# Patient Record
Sex: FEMALE | Race: WHITE | URBAN - METROPOLITAN AREA
[De-identification: names, ages, dates, MRNs, and addresses within clinical notes are randomized per-mention and may not be internally consistent; named-entity substitution may affect disease eponyms.]

---

## 2020-05-01 ENCOUNTER — OFFICE VISIT (OUTPATIENT)
Dept: URGENT CARE | Facility: URGENT CARE | Age: 55
End: 2020-05-01
Payer: COMMERCIAL

## 2020-05-01 VITALS — WEIGHT: 160 LBS | HEIGHT: 64 IN | TEMPERATURE: 98.3 F | BODY MASS INDEX: 27.31 KG/M2

## 2020-05-01 DIAGNOSIS — Z53.21 PATIENT LEFT AFTER TRIAGE: Primary | ICD-10-CM

## 2020-05-01 PROCEDURE — 99207 ZZC NO BILLABLE SERVICE THIS VISIT: CPT | Performed by: FAMILY MEDICINE

## 2020-05-01 RX ORDER — SCOLOPAMINE TRANSDERMAL SYSTEM 1 MG/1
1 PATCH, EXTENDED RELEASE TRANSDERMAL
COMMUNITY
Start: 2020-02-12

## 2020-05-01 RX ORDER — OMEPRAZOLE 40 MG/1
40 CAPSULE, DELAYED RELEASE ORAL
COMMUNITY
Start: 2020-01-15

## 2020-05-01 ASSESSMENT — MIFFLIN-ST. JEOR: SCORE: 1310.76

## 2020-05-02 NOTE — PROGRESS NOTES
Quick triage.  Deep cut over the thenar eminence of the right hand.  Concern for neurovascular injury given location and depth  No numbness or tingling.  Recommended ER  She declined ambulance. Daughter will drive